# Patient Record
Sex: MALE | Race: WHITE
[De-identification: names, ages, dates, MRNs, and addresses within clinical notes are randomized per-mention and may not be internally consistent; named-entity substitution may affect disease eponyms.]

---

## 2019-08-04 ENCOUNTER — HOSPITAL ENCOUNTER (INPATIENT)
Dept: HOSPITAL 35 - ER | Age: 69
LOS: 3 days | Discharge: HOME | DRG: 247 | End: 2019-08-07
Attending: INTERNAL MEDICINE | Admitting: INTERNAL MEDICINE
Payer: COMMERCIAL

## 2019-08-04 VITALS — SYSTOLIC BLOOD PRESSURE: 117 MMHG | DIASTOLIC BLOOD PRESSURE: 82 MMHG

## 2019-08-04 VITALS — DIASTOLIC BLOOD PRESSURE: 80 MMHG | SYSTOLIC BLOOD PRESSURE: 112 MMHG

## 2019-08-04 VITALS — BODY MASS INDEX: 16.12 KG/M2 | HEIGHT: 72 IN | WEIGHT: 119 LBS

## 2019-08-04 VITALS — DIASTOLIC BLOOD PRESSURE: 72 MMHG | SYSTOLIC BLOOD PRESSURE: 110 MMHG

## 2019-08-04 VITALS — SYSTOLIC BLOOD PRESSURE: 112 MMHG | DIASTOLIC BLOOD PRESSURE: 55 MMHG

## 2019-08-04 VITALS — SYSTOLIC BLOOD PRESSURE: 133 MMHG | DIASTOLIC BLOOD PRESSURE: 82 MMHG

## 2019-08-04 VITALS — SYSTOLIC BLOOD PRESSURE: 96 MMHG | DIASTOLIC BLOOD PRESSURE: 82 MMHG

## 2019-08-04 VITALS — SYSTOLIC BLOOD PRESSURE: 105 MMHG | DIASTOLIC BLOOD PRESSURE: 82 MMHG

## 2019-08-04 VITALS — DIASTOLIC BLOOD PRESSURE: 75 MMHG | SYSTOLIC BLOOD PRESSURE: 110 MMHG

## 2019-08-04 VITALS — SYSTOLIC BLOOD PRESSURE: 110 MMHG | DIASTOLIC BLOOD PRESSURE: 75 MMHG

## 2019-08-04 VITALS — SYSTOLIC BLOOD PRESSURE: 100 MMHG | DIASTOLIC BLOOD PRESSURE: 77 MMHG

## 2019-08-04 VITALS — SYSTOLIC BLOOD PRESSURE: 115 MMHG | DIASTOLIC BLOOD PRESSURE: 80 MMHG

## 2019-08-04 DIAGNOSIS — E78.5: ICD-10-CM

## 2019-08-04 DIAGNOSIS — N18.9: ICD-10-CM

## 2019-08-04 DIAGNOSIS — Z86.73: ICD-10-CM

## 2019-08-04 DIAGNOSIS — K21.9: ICD-10-CM

## 2019-08-04 DIAGNOSIS — I21.09: Primary | ICD-10-CM

## 2019-08-04 DIAGNOSIS — Z71.6: ICD-10-CM

## 2019-08-04 DIAGNOSIS — F17.210: ICD-10-CM

## 2019-08-04 DIAGNOSIS — N17.9: ICD-10-CM

## 2019-08-04 DIAGNOSIS — I25.5: ICD-10-CM

## 2019-08-04 DIAGNOSIS — J44.9: ICD-10-CM

## 2019-08-04 DIAGNOSIS — I12.9: ICD-10-CM

## 2019-08-04 DIAGNOSIS — Z79.899: ICD-10-CM

## 2019-08-04 DIAGNOSIS — E78.00: ICD-10-CM

## 2019-08-04 DIAGNOSIS — R63.4: ICD-10-CM

## 2019-08-04 LAB
ANION GAP SERPL CALC-SCNC: 10 MMOL/L (ref 7–16)
BASOPHILS NFR BLD AUTO: 3.1 % (ref 0–2)
BUN SERPL-MCNC: 52 MG/DL (ref 7–18)
CALCIUM SERPL-MCNC: 12 MG/DL (ref 8.5–10.1)
CHLORIDE SERPL-SCNC: 104 MMOL/L (ref 98–107)
CO2 SERPL-SCNC: 23 MMOL/L (ref 21–32)
CREAT SERPL-MCNC: 2.5 MG/DL (ref 0.7–1.3)
EOSINOPHIL NFR BLD: 5.1 % (ref 0–3)
ERYTHROCYTE [DISTWIDTH] IN BLOOD BY AUTOMATED COUNT: 14.4 % (ref 10.5–14.5)
GLUCOSE SERPL-MCNC: 150 MG/DL (ref 74–106)
GRANULOCYTES NFR BLD MANUAL: 56.6 % (ref 36–66)
HCT VFR BLD CALC: 39.8 % (ref 42–52)
HGB BLD-MCNC: 13.1 GM/DL (ref 14–18)
LYMPHOCYTES NFR BLD AUTO: 27.9 % (ref 24–44)
MCH RBC QN AUTO: 31 PG (ref 26–34)
MCHC RBC AUTO-ENTMCNC: 33 G/DL (ref 28–37)
MCV RBC: 93.9 FL (ref 80–100)
MONOCYTES NFR BLD: 7.3 % (ref 1–8)
NEUTROPHILS # BLD: 4.7 THOU/UL (ref 1.4–8.2)
PLATELET # BLD: 229 THOU/UL (ref 150–400)
POTASSIUM SERPL-SCNC: 4.2 MMOL/L (ref 3.5–5.1)
RBC # BLD AUTO: 4.24 MIL/UL (ref 4.5–6)
SODIUM SERPL-SCNC: 137 MMOL/L (ref 136–145)
TROPONIN I SERPL-MCNC: 0.1 NG/ML (ref ?–0.06)
WBC # BLD AUTO: 8.4 THOU/UL (ref 4–11)

## 2019-08-04 PROCEDURE — B410YZZ FLUOROSCOPY OF ABDOMINAL AORTA USING OTHER CONTRAST: ICD-10-PCS | Performed by: INTERNAL MEDICINE

## 2019-08-04 PROCEDURE — 10081 I&D PILONIDAL CYST COMP: CPT

## 2019-08-04 PROCEDURE — B211YZZ FLUOROSCOPY OF MULTIPLE CORONARY ARTERIES USING OTHER CONTRAST: ICD-10-PCS | Performed by: INTERNAL MEDICINE

## 2019-08-04 PROCEDURE — 4A023N7 MEASUREMENT OF CARDIAC SAMPLING AND PRESSURE, LEFT HEART, PERCUTANEOUS APPROACH: ICD-10-PCS | Performed by: INTERNAL MEDICINE

## 2019-08-04 PROCEDURE — 027034Z DILATION OF CORONARY ARTERY, ONE ARTERY WITH DRUG-ELUTING INTRALUMINAL DEVICE, PERCUTANEOUS APPROACH: ICD-10-PCS | Performed by: INTERNAL MEDICINE

## 2019-08-04 PROCEDURE — B215YZZ FLUOROSCOPY OF LEFT HEART USING OTHER CONTRAST: ICD-10-PCS | Performed by: INTERNAL MEDICINE

## 2019-08-05 VITALS — SYSTOLIC BLOOD PRESSURE: 91 MMHG | DIASTOLIC BLOOD PRESSURE: 63 MMHG

## 2019-08-05 VITALS — DIASTOLIC BLOOD PRESSURE: 67 MMHG | SYSTOLIC BLOOD PRESSURE: 165 MMHG

## 2019-08-05 VITALS — SYSTOLIC BLOOD PRESSURE: 110 MMHG | DIASTOLIC BLOOD PRESSURE: 69 MMHG

## 2019-08-05 VITALS — DIASTOLIC BLOOD PRESSURE: 69 MMHG | SYSTOLIC BLOOD PRESSURE: 110 MMHG

## 2019-08-05 VITALS — DIASTOLIC BLOOD PRESSURE: 83 MMHG | SYSTOLIC BLOOD PRESSURE: 116 MMHG

## 2019-08-05 VITALS — DIASTOLIC BLOOD PRESSURE: 76 MMHG | SYSTOLIC BLOOD PRESSURE: 104 MMHG

## 2019-08-05 LAB
ANION GAP SERPL CALC-SCNC: 10 MMOL/L (ref 7–16)
BUN SERPL-MCNC: 42 MG/DL (ref 7–18)
CALCIUM SERPL-MCNC: 11.3 MG/DL (ref 8.5–10.1)
CHLORIDE SERPL-SCNC: 107 MMOL/L (ref 98–107)
CHOLEST SERPL-MCNC: 179 MG/DL (ref ?–200)
CO2 SERPL-SCNC: 22 MMOL/L (ref 21–32)
CREAT SERPL-MCNC: 2.2 MG/DL (ref 0.7–1.3)
ERYTHROCYTE [DISTWIDTH] IN BLOOD BY AUTOMATED COUNT: 14.3 % (ref 10.5–14.5)
GLUCOSE SERPL-MCNC: 120 MG/DL (ref 74–106)
HCT VFR BLD CALC: 35.9 % (ref 42–52)
HDLC SERPL-MCNC: 53 MG/DL (ref 40–?)
HGB BLD-MCNC: 12 GM/DL (ref 14–18)
LDLC SERPL-MCNC: 109 MG/DL (ref ?–100)
MCH RBC QN AUTO: 31.1 PG (ref 26–34)
MCHC RBC AUTO-ENTMCNC: 33.6 G/DL (ref 28–37)
MCV RBC: 92.6 FL (ref 80–100)
PLATELET # BLD: 180 THOU/UL (ref 150–400)
POTASSIUM SERPL-SCNC: 4.5 MMOL/L (ref 3.5–5.1)
RBC # BLD AUTO: 3.87 MIL/UL (ref 4.5–6)
SODIUM SERPL-SCNC: 139 MMOL/L (ref 136–145)
TC:HDL: 3.4 RATIO
TRIGL SERPL-MCNC: 88 MG/DL (ref ?–150)
TROPONIN I SERPL-MCNC: 40.56 NG/ML (ref ?–0.06)
VLDLC SERPL CALC-MCNC: 18 MG/DL (ref ?–40)
WBC # BLD AUTO: 7.8 THOU/UL (ref 4–11)

## 2019-08-05 NOTE — NUR
VSS REMAINS NSR. UP IN ROOM WITH NO C/O CHEST PAIN TODAY. R GROIN CATH SITE
WITH SM BRUISING, NO HEMATOMA. PT C/O TODAY OF NAUSEA AND VOMITING AT TIMES OF
UNDIGESTED FOOD, MD AWARE AND ZOFRAN ORDERED, WITH MODERATE RELIEF OF NAUSEA,
APPETITE POOR. WILL CONTINUE TO MONITER AND CARE FOR PT PER PLAN OF CARE

## 2019-08-05 NOTE — NUR
ADMIT NOTE:RECEIVED REPORT FROM RENU SCHAEFER THE SAME TIME HE BROUGHT THE PATIENT
TO THE UNIT.PT A/ O X 4.COMPLAIN OF HEADACHE,NECK PAIN AND BILATERAL SHOULDER
PAIN WHICH IS CHRONIC AND REFUSED PAIN MEDICATION.PT IS IRRITABLE AND
UNCOOPERATIVE INITIALLY.WANTS TO SIT UP AND TRIES TO BEND HIS RIGHT
LEG.EXPLAINED TO HIM ABOUT THE RISK OF BLEEDING AND HE NEEDS TO BE BEDREST
UNTIL 2200.PT UNDERSTOOD AND USES THE URINAL.VOIDED MORE THAN ADEQUATE
AMOUNT.RIGHT GROIN SITE C/D/I.NO SIGNS OF BLEEDING OR HEMATOMA NOTED.ON
INTEGRILLIN GTT AND DISCONTINUED AFTER 1 BOTTLE AS ORDERED BY DR PRESTON.INTEGRILLIN WAS STARTED IN THE CATH LAB AND WAS ALREADY INFUSING WHEN
PT REACH ROOM 207.PT IS ALSO ON NS  ML/HR AND DISCONTINUED AT 6 AM AS
ORDERED.DENIES CHEST PAIN.COMPLAIN OF NAUSEA AFTER EATING AND DRINKING.
FAMILY WAS HERE.WILL MONITOR AND CONTINUE POC.
ORDERED.

## 2019-08-06 VITALS — DIASTOLIC BLOOD PRESSURE: 47 MMHG | SYSTOLIC BLOOD PRESSURE: 100 MMHG

## 2019-08-06 VITALS — SYSTOLIC BLOOD PRESSURE: 132 MMHG | DIASTOLIC BLOOD PRESSURE: 72 MMHG

## 2019-08-06 VITALS — SYSTOLIC BLOOD PRESSURE: 126 MMHG | DIASTOLIC BLOOD PRESSURE: 90 MMHG

## 2019-08-06 VITALS — SYSTOLIC BLOOD PRESSURE: 106 MMHG | DIASTOLIC BLOOD PRESSURE: 76 MMHG

## 2019-08-06 VITALS — DIASTOLIC BLOOD PRESSURE: 66 MMHG | SYSTOLIC BLOOD PRESSURE: 99 MMHG

## 2019-08-06 VITALS — DIASTOLIC BLOOD PRESSURE: 96 MMHG | SYSTOLIC BLOOD PRESSURE: 125 MMHG

## 2019-08-06 LAB
ANION GAP SERPL CALC-SCNC: 10 MMOL/L (ref 7–16)
BUN SERPL-MCNC: 40 MG/DL (ref 7–18)
CALCIUM SERPL-MCNC: 11.6 MG/DL (ref 8.5–10.1)
CHLORIDE SERPL-SCNC: 104 MMOL/L (ref 98–107)
CO2 SERPL-SCNC: 22 MMOL/L (ref 21–32)
CREAT SERPL-MCNC: 2.2 MG/DL (ref 0.7–1.3)
GLUCOSE SERPL-MCNC: 99 MG/DL (ref 74–106)
POTASSIUM SERPL-SCNC: 4.7 MMOL/L (ref 3.5–5.1)
SODIUM SERPL-SCNC: 136 MMOL/L (ref 136–145)

## 2019-08-06 NOTE — NUR
PT CARE ASSUMED APPROX 0700. PT ALERT AND ORIENTED X4. REPORTS CHRONIC PAIN
IN BACK, NECK AND LEFT SHOULDER BUT REFUSES PAIN MEDS. REPORTS NAUSEA AND WAS
MEDICATED ONCE FOR IT THIS AM. REFUSED MED AFTER. PT TOOK AM MEDS WITH DR PRESTON'S ENCOURAGEMENT. REFUSED NEW ORDER FOR LISINOPRIL D/T RELATIVE HOME
MED. ORDER FOR HOME RECEIVED PER CV NP. LISINOPRIL STOPPED AND NEW HOME MED
STARTERD ONCE PT BROUGHT IN. AFTER HOME MED ENTERED INTO [employer] EMAR PT'S BP
BELOW INDICATION TO GIVE BP MEDS. PT ASYMPTOMATIC. AGREEABLE TO HOLDING MED.
VS OTHERWISE STABLE. EGD CONSENT OBTAINED AND SCHEDUELED FOR AM. PT AMBULATING
IN HALLWAYS WITH FAMILY INTERMITTENTLY THIS SHIFT. STEADY GAIT. COMPLIANT WITH
FLUID RESTRICTION AND DIET. NO DISTRESS NOTED.

## 2019-08-06 NOTE — NUR
A/O X 4.DENIES PAIN AND SOB.COMPLAIN OF NAUSEA BUT REFUSES MEDICATION.INFORMED
PATIENT AND FAMILY THAT ZOFRAN IS AVAILABLE IF NEEDED.RIGHT GROIN
C/D/I;BRUISING NOTED BUT NO HEMATOMA.MONITOR SHOWS SINUS RHYTHM.NO RUNS OF
VTACH NOTED.WILL MONITOR AND CONTINUE POC.

## 2019-08-07 VITALS — DIASTOLIC BLOOD PRESSURE: 87 MMHG | SYSTOLIC BLOOD PRESSURE: 114 MMHG

## 2019-08-07 VITALS — SYSTOLIC BLOOD PRESSURE: 114 MMHG | DIASTOLIC BLOOD PRESSURE: 87 MMHG

## 2019-08-07 VITALS — DIASTOLIC BLOOD PRESSURE: 63 MMHG | SYSTOLIC BLOOD PRESSURE: 87 MMHG

## 2019-08-07 VITALS — SYSTOLIC BLOOD PRESSURE: 125 MMHG | DIASTOLIC BLOOD PRESSURE: 86 MMHG

## 2019-08-07 LAB
HCT VFR BLD CALC: 37.8 % (ref 42–52)
HGB BLD-MCNC: 13 GM/DL (ref 14–18)

## 2019-08-07 NOTE — NUR
ASSUMED PT'S CARE AT 1920; PT. ON BED; AOX4; DURING ASSESSMENT PT. ON BED;
RELATIVE AT THE BED SIDE; NO HS MEDICATION; EDUCATED ABOUT THE REASON OF
KEEPING OPEN DOORS; EDUCATED ABOUT ROUNDING THROUGH THE NIGHT; NOTIFIED LIGHTS
WILL NO TURN ON WHEN ROUNDING; ST. HAVING PAIN WHEN ASKED IF HAS ANY PAIN;
REFUSED PAIN MEDICATION; AROUND 2200 PT. REQUESTED PRN ANTI-NAUSEA MEDICATION;
MEDICATION GIVEN; DURING RE-ASSESSMENT PT. RESTLESS; C/O NAUSEA; ST.
MEDICATION HELPED JUST FOR SOME TIME; REFUSED MORE MEDICATION; REQUESTED
BATHROOM LIGHT OFF; REMINDED OF NPO AFTER MIDNIGHT; ST. UNDERSTANDING; ABLE
TO REST WITH EYES CLOSED AFTER MIDNIGHT; MONITORING; ASSESSMENT AS CHARGED;
FOLLOWING POC; WILL PASS ON REPORT.

## 2019-08-07 NOTE — NUR
ASSUMED PATIENT CARE AT 0700. A/O X4. DENIES CHEST PAIN. 500ML NS GIVEN FOR
LOW BP. PATIENT REFUSED EGD. DC TO HOME NOW.

## 2019-11-19 ENCOUNTER — HOSPITAL ENCOUNTER (OUTPATIENT)
Dept: HOSPITAL 61 - PCVCIMAG | Age: 69
Discharge: HOME | End: 2019-11-19
Attending: INTERNAL MEDICINE
Payer: COMMERCIAL

## 2019-11-19 DIAGNOSIS — I25.10: ICD-10-CM

## 2019-11-19 DIAGNOSIS — I35.1: Primary | ICD-10-CM

## 2019-11-19 DIAGNOSIS — I25.5: ICD-10-CM

## 2019-11-19 DIAGNOSIS — I10: ICD-10-CM

## 2019-11-19 DIAGNOSIS — I21.3: ICD-10-CM

## 2019-11-19 DIAGNOSIS — E78.5: ICD-10-CM

## 2019-11-19 DIAGNOSIS — Z82.49: ICD-10-CM

## 2019-11-19 PROCEDURE — 93306 TTE W/DOPPLER COMPLETE: CPT

## 2021-01-07 ENCOUNTER — HOSPITAL ENCOUNTER (OUTPATIENT)
Dept: HOSPITAL 35 - SJCVC | Age: 71
End: 2021-01-07
Attending: INTERNAL MEDICINE
Payer: COMMERCIAL

## 2021-01-07 DIAGNOSIS — Z79.82: ICD-10-CM

## 2021-01-07 DIAGNOSIS — E78.00: ICD-10-CM

## 2021-01-07 DIAGNOSIS — E78.5: ICD-10-CM

## 2021-01-07 DIAGNOSIS — F12.90: ICD-10-CM

## 2021-01-07 DIAGNOSIS — I25.5: ICD-10-CM

## 2021-01-07 DIAGNOSIS — I25.10: Primary | ICD-10-CM

## 2021-01-07 DIAGNOSIS — R06.02: ICD-10-CM

## 2021-01-07 DIAGNOSIS — Z86.73: ICD-10-CM

## 2021-01-07 DIAGNOSIS — I10: ICD-10-CM

## 2021-01-07 DIAGNOSIS — Z79.899: ICD-10-CM

## 2021-01-07 DIAGNOSIS — R07.9: ICD-10-CM

## 2021-01-20 ENCOUNTER — HOSPITAL ENCOUNTER (OUTPATIENT)
Dept: HOSPITAL 35 - CATH | Age: 71
Setting detail: OBSERVATION
LOS: 1 days | Discharge: HOME | End: 2021-01-21
Attending: INTERNAL MEDICINE | Admitting: INTERNAL MEDICINE
Payer: COMMERCIAL

## 2021-01-20 VITALS — HEIGHT: 72.01 IN | WEIGHT: 130 LBS | BODY MASS INDEX: 17.61 KG/M2

## 2021-01-20 VITALS — DIASTOLIC BLOOD PRESSURE: 85 MMHG | SYSTOLIC BLOOD PRESSURE: 136 MMHG

## 2021-01-20 VITALS — SYSTOLIC BLOOD PRESSURE: 135 MMHG | DIASTOLIC BLOOD PRESSURE: 87 MMHG

## 2021-01-20 VITALS — DIASTOLIC BLOOD PRESSURE: 84 MMHG | SYSTOLIC BLOOD PRESSURE: 119 MMHG

## 2021-01-20 VITALS — DIASTOLIC BLOOD PRESSURE: 99 MMHG | SYSTOLIC BLOOD PRESSURE: 146 MMHG

## 2021-01-20 DIAGNOSIS — N17.9: ICD-10-CM

## 2021-01-20 DIAGNOSIS — E78.00: ICD-10-CM

## 2021-01-20 DIAGNOSIS — E78.5: ICD-10-CM

## 2021-01-20 DIAGNOSIS — I12.9: ICD-10-CM

## 2021-01-20 DIAGNOSIS — N18.9: ICD-10-CM

## 2021-01-20 DIAGNOSIS — I25.10: Primary | ICD-10-CM

## 2021-01-20 LAB
ANION GAP SERPL CALC-SCNC: 12 MMOL/L (ref 7–16)
BUN SERPL-MCNC: 45 MG/DL (ref 7–18)
CALCIUM SERPL-MCNC: 11.8 MG/DL (ref 8.5–10.1)
CHLORIDE SERPL-SCNC: 106 MMOL/L (ref 98–107)
CO2 SERPL-SCNC: 24 MMOL/L (ref 21–32)
CREAT SERPL-MCNC: 2.2 MG/DL (ref 0.7–1.3)
ERYTHROCYTE [DISTWIDTH] IN BLOOD BY AUTOMATED COUNT: 14.1 % (ref 10.5–14.5)
GLUCOSE SERPL-MCNC: 105 MG/DL (ref 74–106)
HCT VFR BLD CALC: 41.2 % (ref 42–52)
HGB BLD-MCNC: 13.5 GM/DL (ref 14–18)
MCH RBC QN AUTO: 31 PG (ref 26–34)
MCHC RBC AUTO-ENTMCNC: 32.7 G/DL (ref 28–37)
MCV RBC: 94.9 FL (ref 80–100)
PLATELET # BLD: 173 THOU/UL (ref 150–400)
POTASSIUM SERPL-SCNC: 4.1 MMOL/L (ref 3.5–5.1)
RBC # BLD AUTO: 4.35 MIL/UL (ref 4.5–6)
SODIUM SERPL-SCNC: 142 MMOL/L (ref 136–145)
WBC # BLD AUTO: 5.2 THOU/UL (ref 4–11)

## 2021-01-20 NOTE — EKG
[place] [address] [facility] [place]  [address]
Phone:  [phone]                    ELECTROCARDIOGRAM REPORT      
_______________________________________________________________________________
 
Name:       EVELIO DELGADO               Room #:                     REG CLI 
M.R.#:      [id number]     Account #:      [id number]  
Admission:  21    Attend Phys:    Joao Ibanez MD,
Discharge:              Date of Birth:  [birth year]  
                                                          Report #: [id number]
   [id number]
_______________________________________________________________________________
                          [facility]
                                       
Test Date:    2021               Test Time:    07:16:32
Pat Name:     EVELIO DELGADO            Department:   
Patient ID:   [id number]            Room:          
Gender:                               Technician:   SBUL
:          1950               Requested By: Joao Ibanez
Order Number: [id number] MD:   Cornell Hernandez
                                 Measurements
Intervals                              Axis          
Rate:         60                       P:            71
WI:           160                      QRS:          -82
QRSD:         100                      T:            64
QT:           395                                    
QTc:          395                                    
                           Interpretive Statements
Sinus rhythm
Left axis deviation
Poor R wave progression
RSR' in V1 or V2, probably normal variant
Compared to ECG 2019 07:16:10
Anterior T wave abnormality is less pronounced
Left anterior fascicular block no longer present
Electronically Signed On 2021 7:40:52 CST by Cornell Hernandez
[email or web address]
 
 
 
 
 
 
 
 
 
 
 
 
 
 
 
 
 
 
  <ELECTRONICALLY SIGNED>
   By: Cornell Hernandez MD, [facility]   
  21     0740
D: 21                           _____________________________________
T: 21                           Cornell Hernandez MD, [facility]     /EPI

## 2021-01-20 NOTE — NUR
PT CAME FROM THE CATH LAB VIA RN ESCORT, UPON ARRIVAL PT WAS ACCOMPANIED BY
DAUGHTER AND BELONGINGS. PT WAS ADAMANT ABOUT USING HIS HOME MEDIATION FOR
TONIGHT. RN CONTACTED , GOT THE OK FOR MED, PHAMRAMCY VERIFIED, ONE
TIME DOSE GIVEN TONIGHT. PT TO DISCHARGE TOMORROW MORNING. VS WERE STABLE UPON
ARRIVAL. R GROIN SITE C/D/I HEMOSTASIS ACHIEVED AT 1620. RN SIGNING OFF

## 2021-01-21 VITALS — DIASTOLIC BLOOD PRESSURE: 107 MMHG | SYSTOLIC BLOOD PRESSURE: 167 MMHG

## 2021-01-21 VITALS — SYSTOLIC BLOOD PRESSURE: 167 MMHG | DIASTOLIC BLOOD PRESSURE: 107 MMHG

## 2021-01-21 VITALS — DIASTOLIC BLOOD PRESSURE: 87 MMHG | SYSTOLIC BLOOD PRESSURE: 129 MMHG

## 2021-01-21 LAB
ALBUMIN SERPL-MCNC: 3.6 G/DL (ref 3.4–5)
ALT SERPL-CCNC: 25 U/L (ref 16–63)
ANION GAP SERPL CALC-SCNC: 8 MMOL/L (ref 7–16)
AST SERPL-CCNC: 18 U/L (ref 15–37)
BILIRUB SERPL-MCNC: 0.7 MG/DL (ref 0.2–1)
BUN SERPL-MCNC: 37 MG/DL (ref 7–18)
CALCIUM SERPL-MCNC: 11.1 MG/DL (ref 8.5–10.1)
CHLORIDE SERPL-SCNC: 110 MMOL/L (ref 98–107)
CO2 SERPL-SCNC: 22 MMOL/L (ref 21–32)
CREAT SERPL-MCNC: 2 MG/DL (ref 0.7–1.3)
ERYTHROCYTE [DISTWIDTH] IN BLOOD BY AUTOMATED COUNT: 13.9 % (ref 10.5–14.5)
GLUCOSE SERPL-MCNC: 98 MG/DL (ref 74–106)
HCT VFR BLD CALC: 39.3 % (ref 42–52)
HGB BLD-MCNC: 13.1 GM/DL (ref 14–18)
MCH RBC QN AUTO: 31.6 PG (ref 26–34)
MCHC RBC AUTO-ENTMCNC: 33.3 G/DL (ref 28–37)
MCV RBC: 94.7 FL (ref 80–100)
PLATELET # BLD: 160 THOU/UL (ref 150–400)
POTASSIUM SERPL-SCNC: 4.3 MMOL/L (ref 3.5–5.1)
PROT SERPL-MCNC: 6.7 G/DL (ref 6.4–8.2)
RBC # BLD AUTO: 4.15 MIL/UL (ref 4.5–6)
SODIUM SERPL-SCNC: 140 MMOL/L (ref 136–145)
TROPONIN I SERPL-MCNC: <0.06 NG/ML (ref ?–0.06)
WBC # BLD AUTO: 6.1 THOU/UL (ref 4–11)

## 2021-01-21 NOTE — NUR
PT S/P CARDIAC STENT.  ACCESSED THROUGH THE RIGHT GROIN SITE.  C/D/I NO
HEMATOMA.  PT ALERT AND ORIENTED, VITALS STABLE, ANTICIPATES TO D/C THIS AM.
NO OTHER CONCERNS.  WILL CONTINUE TO MONITOR.

## 2021-01-21 NOTE — CATHLAB
[facility]
[address]
[place]   [address]                   INVASIVE PROCEDURE REPORT     
_______________________________________________________________________________
 
Name:       EVELIO DELGADO               Room #:         207-P       [facility] Juice MORENO#:      [id number]                       Account #:      [id number]  
Admission:  01/20/21    Attend Phys:    Joao Ibanez MD,
Discharge:  01/21/21    Date of Birth:  [birth year]  
                                                          Report #: [id number]
                                                                    [id number]
_______________________________________________________________________________
THIS REPORT FOR:  
 
cc:  FAM - No family physician/PCP 
     FAM - No family physician/PCP 
     Joao Ibanez MD [facility]                                        
                                                                       ~
 
--------------- APPROVED REPORT --------------
 
 
Study performed:  01/20/2021 11:50:48
 
Patient Details
Patient Status: Out-Patient                  Room #: 
The patient is a 70 year-old male
 
Event Personnel
Joao Ibanez  Interventional Cardiologist, Lizzette Marr RTR, 
RCIS Monitor, Bunny Dykes RN RN, Hailey Salgado  Scrub
 
Procedures Performed
Art Access - R femoral artery*  Teddy Access - R femoral vein  Right 
and Left Heart Cath w/or w/o Coronarie [id number] [facility] JOSE D Place w/wo 
Plasty Single RCA [id number] Hemostasis with Manual pressure  Hemostasis 
w/ Mynx  83826 Initial Mod Sed Same Phys/QHP Gr5y 819211 73885 Mod 
Sed Same Phys/QHP Ea 215963
 
Indication
Chest pain
 
Procedure Narrative
The Right Groin^ was infiltrated with 1% Lidocaine subcutaneous 
anesthesia. A PINNACLE 6FR Sheath #[id number] sheath was inserted into 
the RFA. Coronary angiography was performed using coronary diagnostic 
catheters. The right coronary system was accessed and visualized with 
a LAUNCHER 6FR JR 4 #[id number] catheter. The left coronary system was 
accessed and visualized with a JL4 catheter. The left ventricle was 
accessed and visualized with a Pigtail catheter. Left 
ventricular/Aortic Valve gradient assessed via catheter pullback. 
Left ventriculogram was performed in 30 degree projection. Closure 
device was deployed with a 6 Fr MYNXGRIP 6/7F #[id number]. Hemostasis was 
obtained with manual pressure following sheath removal without any 
complications. The patient tolerated the procedure well and there 
were no complications associated with the procedure. There was no 
hematoma.
 
 
 
[facility]
[address] [place]  [address]
Phone:  [phone]                    INVASIVE PROCEDURE REPORT     
_______________________________________________________________________________
 
Name:            EVELIO DELGADO               Room #:        207-P       [facility]
M.R.#:           [id number]          Account #:     [id number]  
Admission:       01/20/21         Attend Phys:   Joao Ibanez,
Discharge:    01/21/21      Date of Birth: [birth year]  
                         Report #:      [id number]
        [id number]
_______________________________________________________________________________
Intraoperative Conscious Sedation
Sedation start time:  12:30           Case end Time:  
13:26    
Fentanyl  50 mcg    Versed  2 mg  
 
Fluoro Time:    8.80 minutes    
Dose:     DAP 2909.10 cGycm2  364 mGy  
Contrast Type and Amount:  Visipaque 70 ml    
 
Hemodynamics
The right atrial mean pressure is 6 mmHg. The right ventricular 
pressure is 30/4 mmHg. The pulmonary artery pressure is 29/8 mmHg 
with a mean of 16 mmHg. The mean pulmonary capillary wedge pressure 
is 9 mmHg. The aortic pressure is 122/67 mmHg with a mean of 60 mmHg. 
The left ventricular pressure is 112/5 mmHg with a mean of mmHg. The 
left ventricular end diastolic pressure is 21 mmHg. The cardiac 
output using thermo method is 3.90 L/min. The cardiac index using 
thermo method is 2.20 L/min/m2.
 
PCI Technique Lesion
Percutaneous coronary intervention was performed on the mid right 
coronary artery. A LAUNCHER 6FR JR 4 #[id number] Guide Catheter was used 
to engage the ostium. A Luge Wire .014 x 182CM #[id number] Interventional 
Guidewire was used to cross the lesion.
 
STENT DEPLOYMENT
A drug-eluting stent RESOLUTE BRIELLE OTW 2.75 X 12 #[id number] was inserted 
and inflated up to 12.00atm for 34seconds. Additional Inflation: 
14.00atm for 31seconds.
 
Conclusion
#1.  Successful PTCA stent of the mid RCA 80 to 90% lesion placement 
of a 2.75 x 12 resolute [place] drug-eluting stent RUSSELL grade III flow 
0% residual.
#2 left main with mild tapered ostial narrowing widely patent giving 
rise to LAD and circumflex.
#3 LAD with previously placed proximal stents widely patent proximal 
to the diagonal takeoff and then a 60% eccentric lesion in the LAD no 
progression this extends around the apex.
#4  separate ostial circumflex artery with mild disease moderate in 
size but nondominant.
#5 normal left jugular size and anterior inferior apical hypokinesis 
EF 45 to 50% range
 
Recommendations and plan: Continue aggressive risk factor 
 
 
[address]  [address]
Phone:  [phone]                    INVASIVE PROCEDURE REPORT     
_______________________________________________________________________________
 
Name:            EVELIO DELGADO               Room #:        207-P       [facility] [place]
M.R.#:           [id number]          Account #:     [id number]  
Admission:       01/20/21         Attend Phys:   Joao Ibanez,
Discharge:    01/21/21      Date of Birth: [birth year]  
                         Report #:      [id number]
        [id number]
_______________________________________________________________________________
modification continue and initiate dual antiplatelet therapy.  
Patient to CCU in stable condition.  Follow post stent protocol.
 
 
 
 
 
 
 
 
 
 
 
 
 
 
 
 
 
 
 
 
 
 
 
 
 
 
 
 
 
 
 
 
 
 
 
 
 
 
 
 
 
 
  <ELECTRONICALLY SIGNED>
   By: Joao Ibanez MD, FACC   
  01/21/21 1745
D: 01/21/21 1745                           _____________________________________
T: 01/21/21 1745                           Joao Ibanez MD, FACC     /INF

## 2021-01-21 NOTE — EKG
[address] [employer]  [address]
Phone:  [phone]                    ELECTROCARDIOGRAM REPORT      
_______________________________________________________________________________
 
Name:       DELGADOEVELIO G               Room #:         207-[facility] 
M.R.#:      [id number]     Account #:      [id number]  
Admission:  21    Attend Phys:    Joao Ibanez MD,
Discharge:              Date of Birth:  [birth year]  
                                                          Report #: [id number]
   [id number]
_______________________________________________________________________________
                          [facility]
                                       
Test Date:    2021               Test Time:    07:24:11
Pat Name:     EVELIO DELGADO            Department:   
Patient ID:   [id number]            Room:         207 P
Gender:       M                        Technician:   SONAL
:          1950               Requested By: Lucy De Guzman
Order Number: [id number] MD:   Angelito Watkins
                                 Measurements
Intervals                              Axis          
Rate:         69                       P:            42
HI:           161                      QRS:          44
QRSD:         91                       T:            63
QT:           371                                    
QTc:          398                                    
                           Interpretive Statements
Sinus rhythm
RSR' in V1 or V2, right VCD or RVH
Compared to ECG 2021 07:16:32
Right ventricular hypertrophy now present
Left-axis deviation no longer present
Poor R-wave progression no longer present
Electronically Signed On 2021 7:29:13 CST by Angelito Watkins
[email or web address]
 
 
 
 
 
 
 
 
 
 
 
 
 
 
 
 
 
 
 
  <ELECTRONICALLY SIGNED>
   By: Angelito Watkins MD, FAC    
  21
D: 21                           _____________________________________
T: 21                           Angelito Watkins MD, [facility]      /EPI

## 2021-04-28 ENCOUNTER — HOSPITAL ENCOUNTER (OUTPATIENT)
Dept: HOSPITAL 35 - SJCVC | Age: 71
End: 2021-04-28
Attending: INTERNAL MEDICINE
Payer: COMMERCIAL

## 2021-04-28 DIAGNOSIS — Z87.891: ICD-10-CM

## 2021-04-28 DIAGNOSIS — R94.31: Primary | ICD-10-CM

## 2021-04-28 DIAGNOSIS — Z82.49: ICD-10-CM

## 2021-04-28 DIAGNOSIS — I25.10: ICD-10-CM

## 2021-04-28 DIAGNOSIS — J44.9: ICD-10-CM

## 2021-04-28 DIAGNOSIS — Z95.5: ICD-10-CM

## 2021-04-28 DIAGNOSIS — Z86.73: ICD-10-CM

## 2021-04-28 DIAGNOSIS — I25.5: ICD-10-CM

## 2021-04-28 DIAGNOSIS — I45.10: ICD-10-CM

## 2021-04-28 DIAGNOSIS — Z79.899: ICD-10-CM

## 2021-04-28 DIAGNOSIS — E78.00: ICD-10-CM

## 2021-04-28 DIAGNOSIS — I11.9: ICD-10-CM

## 2023-08-07 NOTE — PCVCIMAG
--------------- APPROVED REPORT --------------





Study performed:  2019 08:18:42



EXAM: Comprehensive 2D, Doppler, and color-flow 

Echocardiogram

Patient Location: Echo lab

Status:  routine



BSA:         1.74

HR: 64 bpmBP:          124/76 mmHg

Rhythm: NSR



Other Information 

Study Quality: Adequate



Risk Factors: 

Cardiac Risk Factors:  HTN



Indications

CAD

ischemic cardiomyopathy



2D Dimensions

IVSd:  10.33 (7-11mm)

LVDd:  35.67 mm

PWd:  10.17 (7-11mm)

LVDs:  24.29 (25-40mm)

Left Atrium:  26.13 (27-40mm)

Aortic Root:  33.58 mm

LV Single Plane 4CH:  52.64 %

LV Single Plane 2CH:  49.26 %

Biplane EF:  49.5 %



Volumes

Left Atrial Volume (Systole)

Single Plane 4CH:  44.70 mLSingle Plane 2CH:  42.09 mL

LA ESV Index:  28.00 mL/m2



Aortic Valve

AoV Peak Mynor.:  1.22 m/s

AO Peak Gr.:  5.95 mmHgLVOT Max P.06 mmHg

LVOT Max V:  1.01 m/s



Mitral Valve

E/A Ratio:  1.0

MV Decel. Time:  237.11 ms

MV E Max Mynor.:  0.44 m/s

MV A Mynor.:  0.46 m/s

IVRT:  145.33 ms



Pulmonary Valve

PV Peak Mynor.:  0.81 m/sPV Peak Gr.:  2.63 mmHg



Pulmonary Vein

P Vein S:    0.26 m/sP Vein A:  0.37 m/s

P Vein D:   0.34 m/sP Vein A Dur.:  121.1 msec

P Vein S/D Ratio:  0.76



Tricuspid Valve

TR Peak Mynor.:  2.31 m/s

TR Peak Gr.:  21.36 mmHg



Left Ventricle

The left ventricle is normal size. Inferoapical hypokinesis. There is 

normal left ventricular wall thickness. Left ventricular systolic 

function is borderline lower limits of normal. LVEF is 50%. Grade I - 

abnormal relaxation pattern.



Right Ventricle

The right ventricle is normal size. The right ventricular systolic 

function is normal.



Atria

The left atrium size is normal. The right atrium size is 

normal.



Aortic Valve

The aortic valve is normal in structure. Moderate aortic 

regurgitation. There is no aortic valvular stenosis.



Mitral Valve

The mitral valve is normal in structure. There is no mitral valve 

regurgitation noted. No evidence of mitral valve stenosis.



Tricuspid Valve

The tricuspid valve is normal in structure. Trace tricuspid 

regurgitation with PAP of 28 mmHg.



Pulmonic Valve

The pulmonary valve is normal in structure. Trace pulmonic 

regurgitation.



Great Vessels

The aortic root is normal in size. IVC is normal in size and 

collapses >50% with inspiration.



Pericardium

There is no pericardial effusion. There is no pleural 

effusion.



<Conclusion>

The left ventricle is normal size.

Left ventricular systolic function is borderline lower limits of 

normal.

LVEF is 50%.

Grade I - abnormal relaxation pattern.

Inferoapical hypokinesis.

The right ventricle is normal size.

The left atrium size is normal.

Moderate aortic regurgitation.

There is no mitral valve regurgitation noted.

Trace tricuspid regurgitation with PAP of 28 mmHg.

The aortic root is normal in size.

There is no pericardial effusion.
No (0)